# Patient Record
Sex: FEMALE
[De-identification: names, ages, dates, MRNs, and addresses within clinical notes are randomized per-mention and may not be internally consistent; named-entity substitution may affect disease eponyms.]

---

## 2021-01-21 ENCOUNTER — HOSPITAL ENCOUNTER (OUTPATIENT)
Dept: HOSPITAL 62 - SP | Age: 56
End: 2021-01-21
Attending: NURSE PRACTITIONER
Payer: MEDICARE

## 2021-01-21 DIAGNOSIS — L97.222: Primary | ICD-10-CM

## 2021-01-21 PROCEDURE — 93970 EXTREMITY STUDY: CPT

## 2021-01-21 PROCEDURE — 93925 LOWER EXTREMITY STUDY: CPT

## 2021-01-21 PROCEDURE — 93922 UPR/L XTREMITY ART 2 LEVELS: CPT

## 2021-01-21 NOTE — RADIOLOGY REPORT (SQ)
EXAM DESCRIPTION:  ARTERIAL LOWER EXTREM BILAT; PHYSIO ARTERIAL LTD



IMAGES COMPLETED DATE/TIME:  1/21/2021 4:03 pm



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



COMPARISON:  None.



TECHNIQUE:  Dynamic and static gray scale and color images acquired of the lower extremity arteries. 
Additional selected spectral images recorded.   ABIs recorded.



LIMITATIONS:  None.



FINDINGS:  RIGHT LEG:

ABIS: Normal, over 1.0.

INFLOW ARTERIES: Normal, no obstruction evident.

FEMORAL ARTERIES:Triphasic waveforms. Normal, no velocity elevation to suggest focal stenosis. Normal
 color Doppler evaluation.  No aneurysm.

POPLITEAL ARTERY:Triphasic waveforms. Normal, no velocity elevation to suggest focal stenosis. Normal
 color Doppler evaluation.  No aneurysm.

PATENT TIBIOPERONEAL TRUNK AND 3 VESSEL RUNOFF: Yes, normal vessels.

TBI: Not performed.

OTHER: No other significant finding.

LEFT LEG:

ABIS: Normal, over 1.0.

INFLOW ARTERIES: Normal, no obstruction evident.

FEMORAL ARTERIES:Triphasic waveforms. Normal, no velocity elevation to suggest focal stenosis. Normal
 color Doppler evaluation.  No aneurysm.

POPLITEAL ARTERY:Triphasic waveforms. Normal, no velocity elevation to suggest focal stenosis. Normal
 color Doppler evaluation.  No aneurysm.

PATENT TIBIOPERONEAL TRUNK AND 3 VESSEL RUNOFF: Yes, normal vessels.

TBI: Not performed.

OTHER: No other significant finding.



IMPRESSION:  NORMAL BILATERAL LOWER EXTREMITY ARTERIAL DOPPLER WITH ABIs.



COMMENT:  [facility]

NORMAL: Greater than 1.0

MINIMAL DISEASE: 0.9 to 1.0

CLAUDICATION: 0.5 to 0.9

SEVERE ARTERIAL DISEASE:  Less than 0.5

[facility] AND [facility]

NORMAL: Greater than 1.0 (1.2 If Heavy Calcifications)

NORMAL TO MILD ISCHEMIA: 0.8 to 1.0

MODERATE ISCHEMIA: 0.4 to 0.8

SEVERE ISCHEMIA:  Less than 0.4



TECHNICAL DOCUMENTATION:  JOB ID:  [id number]

 2011 [employer]- All Rights Reserved



Reading location - IP/workstation name: [email or web address]

## 2021-01-21 NOTE — RADIOLOGY REPORT (SQ)
EXAM DESCRIPTION:  ARTERIAL LOWER EXTREM BILAT; PHYSIO ARTERIAL LTD



IMAGES COMPLETED DATE/TIME:  1/21/2021 4:03 pm



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



COMPARISON:  None.



TECHNIQUE:  Dynamic and static gray scale and color images acquired of the lower extremity arteries. 
Additional selected spectral images recorded.   ABIs recorded.



LIMITATIONS:  None.



FINDINGS:  RIGHT LEG:

ABIS: Normal, over 1.0.

INFLOW ARTERIES: Normal, no obstruction evident.

FEMORAL ARTERIES:Triphasic waveforms. Normal, no velocity elevation to suggest focal stenosis. Normal
 color Doppler evaluation.  No aneurysm.

POPLITEAL ARTERY:Triphasic waveforms. Normal, no velocity elevation to suggest focal stenosis. Normal
 color Doppler evaluation.  No aneurysm.

PATENT TIBIOPERONEAL TRUNK AND 3 VESSEL RUNOFF: Yes, normal vessels.

TBI: Not performed.

OTHER: No other significant finding.

LEFT LEG:

ABIS: Normal, over 1.0.

INFLOW ARTERIES: Normal, no obstruction evident.

FEMORAL ARTERIES:Triphasic waveforms. Normal, no velocity elevation to suggest focal stenosis. Normal
 color Doppler evaluation.  No aneurysm.

POPLITEAL ARTERY:Triphasic waveforms. Normal, no velocity elevation to suggest focal stenosis. Normal
 color Doppler evaluation.  No aneurysm.

PATENT TIBIOPERONEAL TRUNK AND 3 VESSEL RUNOFF: Yes, normal vessels.

TBI: Not performed.

OTHER: No other significant finding.



IMPRESSION:  NORMAL BILATERAL LOWER EXTREMITY ARTERIAL DOPPLER WITH ABIs.



COMMENT:  [facility]

NORMAL: Greater than 1.0

MINIMAL DISEASE: 0.9 to 1.0

CLAUDICATION: 0.5 to 0.9

SEVERE ARTERIAL DISEASE:  Less than 0.5

[facility] AND [facility]

NORMAL: Greater than 1.0 (1.2 If Heavy Calcifications)

NORMAL TO MILD ISCHEMIA: 0.8 to 1.0

MODERATE ISCHEMIA: 0.4 to 0.8

SEVERE ISCHEMIA:  Less than 0.4



TECHNICAL DOCUMENTATION:  JOB ID:  [id number]

 2011 [employer]- All Rights Reserved



Reading location - IP/workstation name: [email or web address]

## 2021-01-21 NOTE — RADIOLOGY REPORT (SQ)
EXAM DESCRIPTION:  TIBIA FIBULA LEFT



IMAGES COMPLETED DATE/TIME:  1/21/2021 1:29 pm



REASON FOR STUDY:  NON-PRESSURE CHRONIC ULCER OF LFET CALF W/ FAT LAYER EXPOSED L97.222  NON-PRESSURE
 CHRONIC ULCER OF LEFT CALF W FAT LAYER



COMPARISON:  None.



NUMBER OF VIEWS:  Two views.



TECHNIQUE:  Two radiographic images acquired of the left tibia and fibula to include the knee and ank
le in at least one projection.



LIMITATIONS:  Bandaging material overlying the lower leg obscures underlying osseous detail.



FINDINGS:  MINERALIZATION: Normal.

BONES: Severe tricompartmental degenerative changes of the knee.  7 cm segment of periosteal reaction
 involving the medial aspect of the proximal tibial diaphysis (approximately 11 cm caudal to the medi
al joint line) and a 3 cm segment of periosteal reaction involving the medial aspect of the proximal 
tibial metaphysis (approximately 4 cm caudal to the medial joint line).  Bandaging material is seen i
nvolving the medial soft tissues just cranial to the medial malleolus ; this partially obscures the d
istal tibial cortex.  The appearance of periosteal indistinctness and cortical lucency is nonspecific
, and may represent confluence of shadows versus osteomyelitis.

SOFT TISSUES: 2 surgical clips are seen underlying the bandaging material ; the underlying soft tissu
es appear heterogeneous.

OTHER: No other significant finding.



IMPRESSION:  Soft tissue heterogeneity just cranial to the medial malleolus; associated bandaging mat
erial partially obscures the underlying osseous structures.  Periosteal indistinctness and cortical l
ucency may represent osseous extension.  Additional multifocal periosteal indistinctness seen cranial
 to and remote from the site may represent chronic venous stasis, tug reaction, osteomyelitis, etc.  
MRI is both more sensitive and more specific for the evaluation of osteomyelitis.



TECHNICAL DOCUMENTATION:  JOB ID:  [id number]

 [employer]- All Rights Reserved



Reading location - IP/workstation name: [email or web address]

## 2021-01-21 NOTE — RADIOLOGY REPORT (SQ)
EXAM DESCRIPTION:  VENOUS REFLUX



IMAGES COMPLETED DATE/TIME:  1/21/2021 4:03 pm



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



COMPARISON:  None.



TECHNIQUE:  Multiple real-time grayscale sonographic images were obtained for evaluation of the right
 and left lower extremity. Doppler and duplex evaluation of the venous structures was performed.



LIMITATIONS:  None.



FINDINGS:  The right and left common femoral, superficial femoral, popliteal and infrapopliteal veins
 are patent with normal response to compression and augmentation maneuvers.

Right greater saphenous vein: Nondilated.  No significant reflux.

Right small saphenous vein: Minimally dilated measuring up to 3.8 mm.  There is greater than 0.8 seco
nds of reflux noted.

Left greater saphenous vein: Minimally dilated measuring up to 6.7 mm.  There is greater than 1.2 sec
onds of reflux at the saphenous femoral junction.

Left small saphenous vein: Nondilated.  No reflux.

OTHER:

No solid or cystic masses or other abnormal findings.



IMPRESSION:  1.  No evidence of DVT or SVT within either lower extremity.

2.  Mildly dilated left greater saphenous vein with greater than 1.2 seconds of reflux noted at the s
aphenous femoral junction.

3.  Mildly dilated right small saphenous vein with greater and 0.8 seconds of reflux.



TECHNICAL DOCUMENTATION:  JOB ID:  [id number]

 2011 [employer]- All Rights Reserved



Reading location - IP/workstation name: [email or web address]